# Patient Record
Sex: FEMALE | Race: WHITE | NOT HISPANIC OR LATINO | URBAN - METROPOLITAN AREA
[De-identification: names, ages, dates, MRNs, and addresses within clinical notes are randomized per-mention and may not be internally consistent; named-entity substitution may affect disease eponyms.]

---

## 2023-07-27 ENCOUNTER — MEDICAL CORRESPONDENCE (OUTPATIENT)
Dept: HEALTH INFORMATION MANAGEMENT | Facility: CLINIC | Age: 58
End: 2023-07-27

## 2023-08-01 ENCOUNTER — TRANSCRIBE ORDERS (OUTPATIENT)
Dept: OTHER | Age: 58
End: 2023-08-01

## 2023-08-01 DIAGNOSIS — H53.9 VISION CHANGES: ICD-10-CM

## 2023-08-01 DIAGNOSIS — R20.2 NUMBNESS AND TINGLING: ICD-10-CM

## 2023-08-01 DIAGNOSIS — R20.0 NUMBNESS AND TINGLING: ICD-10-CM

## 2023-08-01 DIAGNOSIS — R20.9 SENSATION DISORDER: Primary | ICD-10-CM

## 2023-10-08 ENCOUNTER — HEALTH MAINTENANCE LETTER (OUTPATIENT)
Age: 58
End: 2023-10-08

## 2023-10-29 NOTE — TELEPHONE ENCOUNTER
10/29/2023-Request for images faxed to [facility] @ 1131am    Action Barb Arroyo on 11/13/2023 at 9:02 AM   Action Taken Imaging resolved into PACS. -JA       RECORDS RECEIVED FROM:    REASON FOR VISIT: Vision Changes    Date of Appt: 12/15/2023   NOTES (FOR ALL VISITS) STATUS DETAILS   OFFICE NOTE from referring provider ERICK Chance-7/25/2023   OFFICE NOTE from other specialist     DISCHARGE SUMMARY from hospital     DISCHARGE REPORT from the ER     OPERATIVE REPORT     [facility] (MS ONLY)     EMG     EEG     MEDICATION LIST     IMAGING  (FOR ALL VISITS)     MRI (HEAD, NECK, SPINE) PACS MRA Head/Neck-7/25/2023    MRI Cervical Spine-6/23/2023    MRI Lumbar Spine-5/23/2023    MR Brain-5/8/2023

## 2023-12-15 ENCOUNTER — PRE VISIT (OUTPATIENT)
Dept: NEUROLOGY | Facility: CLINIC | Age: 58
End: 2023-12-15

## 2023-12-15 ENCOUNTER — VIRTUAL VISIT (OUTPATIENT)
Dept: NEUROLOGY | Facility: CLINIC | Age: 58
End: 2023-12-15
Payer: COMMERCIAL

## 2023-12-15 VITALS — BODY MASS INDEX: 34.93 KG/M2 | HEIGHT: 61 IN | WEIGHT: 185 LBS

## 2023-12-15 DIAGNOSIS — R90.82 WHITE MATTER ABNORMALITY ON MRI OF BRAIN: Primary | ICD-10-CM

## 2023-12-15 PROCEDURE — 99417 PROLNG OP E/M EACH 15 MIN: CPT | Performed by: PSYCHIATRY & NEUROLOGY

## 2023-12-15 PROCEDURE — 99205 OFFICE O/P NEW HI 60 MIN: CPT | Mod: 95 | Performed by: PSYCHIATRY & NEUROLOGY

## 2023-12-15 RX ORDER — VILAZODONE HYDROCHLORIDE 40 MG/1
1 TABLET ORAL EVERY MORNING
COMMUNITY
Start: 2023-09-13 | End: 2024-09-12

## 2023-12-15 RX ORDER — MORPHINE SULFATE 60 MG/1
60 TABLET, FILM COATED, EXTENDED RELEASE ORAL EVERY 12 HOURS
COMMUNITY
Start: 2022-05-08

## 2023-12-15 RX ORDER — HYDROXYZINE HYDROCHLORIDE 25 MG/1
TABLET, FILM COATED ORAL
COMMUNITY
Start: 2023-08-25

## 2023-12-15 RX ORDER — LORAZEPAM 0.5 MG/1
TABLET ORAL
COMMUNITY
Start: 2023-10-12

## 2023-12-15 RX ORDER — DIPHENHYDRAMINE HCL 25 MG
50 CAPSULE ORAL
COMMUNITY

## 2023-12-15 RX ORDER — BUSPIRONE HYDROCHLORIDE 10 MG/1
2 TABLET ORAL 3 TIMES DAILY
COMMUNITY
Start: 2023-09-20

## 2023-12-15 RX ORDER — IBUPROFEN 400 MG/1
400 TABLET, FILM COATED ORAL
COMMUNITY

## 2023-12-15 RX ORDER — ACETAMINOPHEN 500 MG
500 TABLET ORAL
COMMUNITY
Start: 2023-04-21

## 2023-12-15 RX ORDER — GABAPENTIN 100 MG/1
1 CAPSULE ORAL AT BEDTIME
COMMUNITY
Start: 2023-10-03

## 2023-12-15 ASSESSMENT — PATIENT HEALTH QUESTIONNAIRE - PHQ9: SUM OF ALL RESPONSES TO PHQ QUESTIONS 1-9: 24

## 2023-12-15 ASSESSMENT — PAIN SCALES - GENERAL: PAINLEVEL: EXTREME PAIN (8)
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
## 2023-12-15 NOTE — PROGRESS NOTES
Virtual Visit Details    Type of service:  Video Visit     Originating Location (pt. Location): Home    Distant Location (provider location):  On-site  Platform used for Video Visit: Eliot
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
## 2023-12-17 ENCOUNTER — HEALTH MAINTENANCE LETTER (OUTPATIENT)
Age: 58
End: 2023-12-17

## 2024-04-22 ENCOUNTER — TELEPHONE (OUTPATIENT)
Dept: NEUROPSYCHOLOGY | Facility: CLINIC | Age: 59
End: 2024-04-22
Payer: COMMERCIAL

## 2024-04-22 NOTE — TELEPHONE ENCOUNTER
Left Voicemail (1st Attempt) and Sent Zamzeehart (1st Attempt) for the patient to call back and schedule the following:    Appointment type: New NeuroPsych Eval  Provider: Dr. Espinosa or any Provider  Return date: [place] available at  [place] [facility]  [place]ty phone number: [phone]  Additional appointment(s) needed:   Additonal Notes:     Attempted to reschedule the appointment with Dr. Espinosa on 8/19/2024, provider template change.     Also sent a [employer] message.    Liliana LERMA/Keith Procedure    [facility]   Neurology, NeuroSurgery, NeuroPsychology, Pain Management and Cardiology Specialties  Medical/Surgical Adult Specialties

## 2024-05-06 ENCOUNTER — TELEPHONE (OUTPATIENT)
Dept: NEUROPSYCHOLOGY | Facility: CLINIC | Age: 59
End: 2024-05-06
Payer: COMMERCIAL

## 2024-05-06 NOTE — TELEPHONE ENCOUNTER
Patient Contacted for the patient to call back and schedule the following:    Appointment type: NeuroPsych Eval  Provider: Dr. Espinosa or any Provider  Return date: next available  Specialty phone number: [phone]  Additional appointment(s) needed:   Additonal Notes:     Patient would like to speak with someone regardiing the appointment that is being rescheduled again(was with  on 8/14/2024), with Dr. Espinosa on 8/19/2024.    She is not happy and doesn't want to wait until April 2025 to see Dr. Espinosa.    Please call the patient to discuss a sooner appointment. She can be reached at home. Thank you.    Liliana LERMA/Keith Procedure    [facility]   Neurology, NeuroSurgery, NeuroPsychology, Pain Management and Cardiology Specialties  Medical/Surgical Adult Specialties

## 2024-05-13 ENCOUNTER — TELEPHONE (OUTPATIENT)
Dept: NEUROLOGY | Facility: CLINIC | Age: 59
End: 2024-05-13
Payer: COMMERCIAL

## 2024-05-13 NOTE — TELEPHONE ENCOUNTER
Per psychometrists, reached out to patient to offer 5/15 appt with Dr Zimmerman at [facility]. Patient declined this appt due to not being enough in advance for her  to have off work; Sent response to psychometrists to advise.    Pt can do something at least two weeks in advance, sooner than current reschedule if possible. Pt is upset with multiple reschedules from Dr. Espinosa's clinic.    Liliana Keating on 5/13/2024 at 12:15 PM

## 2024-05-13 NOTE — TELEPHONE ENCOUNTER
Routing to clinic coordinators. Please offer the patient an appointment with Dr. Juany Zimmerman for this week on Wednesday 5/15 at 12:30 PM at the [facility].    Timeline: 5/15 12:30 PM  Location: [place] NEUROPSYCHOLOGY  Preferred Provider: Erasmo  Visit Type: NEUROPSYCH EVAL  Special Instructions:    Thank you!  Mili

## 2024-05-14 NOTE — TELEPHONE ENCOUNTER
Routing to clinic coordinators. Please offer the following slot to the patient for her neuropsych eval.    Schedule per protocol    Timeline: Friday 6/7 at 8:00 AM  Location: [facility] NEUROPSYCHOLOGY  Preferred Provider: Francisca  Visit Type: NEUROPSYCH EVAL  Special Instructions:    Thank you!  Mili Dorado  Psychometrist

## 2024-05-15 NOTE — TELEPHONE ENCOUNTER
Patient confirmed scheduled appointment:  Date: 6/7  Time: 8 am   Visit type: Neuropsychology Eval  Provider: Dr. Espinosa   [place] [facility] [place] provide to patient and also request a paper mail.  Testing/imaging: n/a  Additional notes: In basket message         Katie Kennedy on 5/15/2024 at 1:37 PM

## 2025-01-12 ENCOUNTER — HEALTH MAINTENANCE LETTER (OUTPATIENT)
Age: 60
End: 2025-01-12